# Patient Record
Sex: FEMALE | Race: WHITE | URBAN - METROPOLITAN AREA
[De-identification: names, ages, dates, MRNs, and addresses within clinical notes are randomized per-mention and may not be internally consistent; named-entity substitution may affect disease eponyms.]

---

## 2017-01-11 ENCOUNTER — OFFICE VISIT (OUTPATIENT)
Dept: OCCUPATIONAL MEDICINE | Age: 52
End: 2017-01-11
Attending: PHYSICIAN ASSISTANT
Payer: COMMERCIAL

## 2017-01-11 DIAGNOSIS — Z01.84 IMMUNITY STATUS TESTING: Primary | ICD-10-CM

## 2017-01-11 PROCEDURE — 87389 HIV-1 AG W/HIV-1&-2 AB AG IA: CPT

## 2017-06-22 ENCOUNTER — OFFICE VISIT (OUTPATIENT)
Dept: OCCUPATIONAL MEDICINE | Age: 52
End: 2017-06-22
Attending: PHYSICIAN ASSISTANT

## 2017-08-10 ENCOUNTER — OFFICE VISIT (OUTPATIENT)
Dept: OCCUPATIONAL MEDICINE | Age: 52
End: 2017-08-10
Attending: PHYSICIAN ASSISTANT
Payer: OTHER MISCELLANEOUS

## 2017-08-10 DIAGNOSIS — Z77.21 EXPOSURE TO BLOOD-BORNE PATHOGEN: Primary | ICD-10-CM

## 2017-08-10 LAB
HBV SURFACE AB SER QL: REACTIVE
HBV SURFACE AB SERPL IA-ACNC: >1000 MIU/ML
HEPATITIS C VIRUS AB INTERPRETATION: NONREACTIVE

## 2017-08-10 PROCEDURE — 86803 HEPATITIS C AB TEST: CPT

## 2017-08-10 PROCEDURE — 87389 HIV-1 AG W/HIV-1&-2 AB AG IA: CPT

## 2017-08-10 PROCEDURE — 86706 HEP B SURFACE ANTIBODY: CPT

## 2021-01-13 ENCOUNTER — TELEPHONE (OUTPATIENT)
Dept: SURGERY | Facility: CLINIC | Age: 56
End: 2021-01-13

## 2021-01-13 ENCOUNTER — VIRTUAL PHONE E/M (OUTPATIENT)
Dept: SURGERY | Facility: CLINIC | Age: 56
End: 2021-01-13
Payer: COMMERCIAL

## 2021-01-13 DIAGNOSIS — Z12.11 SCREEN FOR COLON CANCER: Primary | ICD-10-CM

## 2021-01-13 PROCEDURE — 99202 OFFICE O/P NEW SF 15 MIN: CPT | Performed by: SURGERY

## 2021-01-13 NOTE — H&P
New Patient Visit Note       Active Problems      No diagnosis found. Chief Complaint   No chief complaint on file. History of Present Illness         Allergies  Zoila Anderson has no allergies on file.     Past Medical / Surgical / Social / Family History encounter. Imaging & Referrals   None    Follow Up  No follow-ups on file.     Dominik Mendoza, DO

## 2021-01-13 NOTE — H&P
Charlie Mondragon is a 63year old female  No chief complaint on file. REFERRED BY    Patient presents via phone consultation to discuss colonoscopy. Patient has never had a prior colonoscopy.   She has a positive mother and positive sister who has a his affect      STUDIES:     Office Visit on 08/10/2017   Component Date Value Ref Range Status   • Hepatitis C Virus 08/10/2017 Nonreactive   Nonreactive  Final   • Hepatitis B Surface Antibody 08/10/2017 Reactive * Nonreactive  Final    Nonreactive- No Prote

## 2021-01-13 NOTE — TELEPHONE ENCOUNTER
Attempted to call patient to schedule colonoscopy and go over colonoscopy instructions. [facility] office.

## 2021-01-15 ENCOUNTER — TELEPHONE (OUTPATIENT)
Dept: SURGERY | Facility: CLINIC | Age: 56
End: 2021-01-15

## 2021-01-15 DIAGNOSIS — Z12.11 SCREEN FOR COLON CANCER: Primary | ICD-10-CM

## 2021-01-15 RX ORDER — SODIUM, POTASSIUM,MAG SULFATES 17.5-3.13G
SOLUTION, RECONSTITUTED, ORAL ORAL
Qty: 1 KIT | Refills: 0 | Status: SHIPPED | OUTPATIENT
Start: 2021-01-15

## 2021-01-15 NOTE — TELEPHONE ENCOUNTER
Pt had colonoscopy consult with Dr. Valentino Preciado on 1/13/2021. Called patient to schedule and review colonoscopy instructions. Message also sent with instructions to patient's my chart.

## 2021-02-03 ENCOUNTER — TELEPHONE (OUTPATIENT)
Dept: SURGERY | Facility: CLINIC | Age: 56
End: 2021-02-03

## 2021-02-03 DIAGNOSIS — Z12.11 SPECIAL SCREENING FOR MALIGNANT NEOPLASMS, COLON: Primary | ICD-10-CM

## 2021-04-01 DIAGNOSIS — Z23 NEED FOR VACCINATION: ICD-10-CM

## 2021-04-07 PROCEDURE — 88305 TISSUE EXAM BY PATHOLOGIST: CPT | Performed by: SURGERY

## 2021-04-26 ENCOUNTER — PATIENT OUTREACH (OUTPATIENT)
Dept: SURGERY | Facility: CLINIC | Age: 56
End: 2021-04-26

## 2021-04-28 ENCOUNTER — MED REC SCAN ONLY (OUTPATIENT)
Dept: SURGERY | Facility: CLINIC | Age: 56
End: 2021-04-28

## (undated) NOTE — MR AVS SNAPSHOT
After Visit Summary   1/13/2021    Nicolette Garzon    MRN: [id number]           Visit Information     Date & Time  1/13/2021  4:00 PM Provider  Carolina Kelly  Department  [facility] [address] [facility], [address] [facility]  Phone  [phone] Monday – Friday  8:00 am – 8:00 pm   Saturday – Sunday  8:00 am – 4:00 pm    WALK-IN CARE  Primary Care Providers  Treatment for acute illness   or injury that are   non-life-threatening.   Also available by appointment     Average cost  $70*       [facility]